# Patient Record
Sex: FEMALE | Race: WHITE
[De-identification: names, ages, dates, MRNs, and addresses within clinical notes are randomized per-mention and may not be internally consistent; named-entity substitution may affect disease eponyms.]

---

## 2017-12-15 ENCOUNTER — HOSPITAL ENCOUNTER (EMERGENCY)
Dept: HOSPITAL 10 - FTE | Age: 37
LOS: 1 days | Discharge: LEFT BEFORE DISCHARGE | End: 2017-12-16
Payer: SELF-PAY

## 2017-12-15 VITALS
HEIGHT: 63 IN | WEIGHT: 132.5 LBS | HEIGHT: 63 IN | WEIGHT: 132.5 LBS | BODY MASS INDEX: 23.48 KG/M2 | BODY MASS INDEX: 23.48 KG/M2

## 2017-12-15 DIAGNOSIS — Z53.21: Primary | ICD-10-CM

## 2018-01-26 ENCOUNTER — HOSPITAL ENCOUNTER (INPATIENT)
Dept: HOSPITAL 12 - ER | Age: 38
LOS: 1 days | Discharge: HOME | DRG: 756 | End: 2018-01-27
Attending: INTERNAL MEDICINE | Admitting: INTERNAL MEDICINE
Payer: COMMERCIAL

## 2018-01-26 VITALS — SYSTOLIC BLOOD PRESSURE: 139 MMHG | DIASTOLIC BLOOD PRESSURE: 93 MMHG

## 2018-01-26 VITALS — HEIGHT: 67 IN | BODY MASS INDEX: 19.78 KG/M2 | WEIGHT: 126 LBS

## 2018-01-26 VITALS — SYSTOLIC BLOOD PRESSURE: 146 MMHG | DIASTOLIC BLOOD PRESSURE: 94 MMHG

## 2018-01-26 VITALS — DIASTOLIC BLOOD PRESSURE: 91 MMHG | SYSTOLIC BLOOD PRESSURE: 134 MMHG

## 2018-01-26 DIAGNOSIS — I45.6: ICD-10-CM

## 2018-01-26 DIAGNOSIS — F41.9: Primary | ICD-10-CM

## 2018-01-26 DIAGNOSIS — R94.31: ICD-10-CM

## 2018-01-26 LAB
ALP SERPL-CCNC: 70 U/L (ref 50–136)
ALT SERPL W/O P-5'-P-CCNC: 16 U/L (ref 14–59)
AST SERPL-CCNC: 16 U/L (ref 15–37)
BASOPHILS # BLD AUTO: 0 K/UL (ref 0–8)
BASOPHILS NFR BLD AUTO: 0.3 % (ref 0–2)
BILIRUB DIRECT SERPL-MCNC: 0.1 MG/DL (ref 0–0.2)
BILIRUB SERPL-MCNC: 0.5 MG/DL (ref 0.2–1)
BUN SERPL-MCNC: 9 MG/DL (ref 7–18)
CHLORIDE SERPL-SCNC: 102 MMOL/L (ref 98–107)
CO2 SERPL-SCNC: 23 MMOL/L (ref 21–32)
CREAT SERPL-MCNC: 0.8 MG/DL (ref 0.6–1.3)
EOSINOPHIL # BLD AUTO: 0 K/UL (ref 0–0.7)
EOSINOPHIL NFR BLD AUTO: 0.1 % (ref 0–7)
GLUCOSE SERPL-MCNC: 112 MG/DL (ref 74–106)
HCG UR QL: NEGATIVE
HCT VFR BLD AUTO: 40.9 % (ref 31.2–41.9)
HGB BLD-MCNC: 14.6 G/DL (ref 10.9–14.3)
LYMPHOCYTES # BLD AUTO: 1 K/UL (ref 20–40)
LYMPHOCYTES NFR BLD AUTO: 17.9 % (ref 20.5–51.5)
MCH RBC QN AUTO: 33 UUG (ref 24.7–32.8)
MCHC RBC AUTO-ENTMCNC: 36 G/DL (ref 32.3–35.6)
MCV RBC AUTO: 92.4 FL (ref 75.5–95.3)
MONOCYTES # BLD AUTO: 0.4 K/UL (ref 2–10)
MONOCYTES NFR BLD AUTO: 7.2 % (ref 0–11)
NEUTROPHILS # BLD AUTO: 4.2 K/UL (ref 1.8–8.9)
NEUTROPHILS NFR BLD AUTO: 74.5 % (ref 38.5–71.5)
PLATELET # BLD AUTO: 264 K/UL (ref 179–408)
POTASSIUM SERPL-SCNC: 4 MMOL/L (ref 3.5–5.1)
RBC # BLD AUTO: 4.42 MIL/UL (ref 3.63–4.92)
WBC # BLD AUTO: 5.6 K/UL (ref 3.8–11.8)
WS STN SPEC: 8.3 G/DL (ref 6.4–8.2)

## 2018-01-26 PROCEDURE — A4663 DIALYSIS BLOOD PRESSURE CUFF: HCPCS

## 2018-01-26 NOTE — NUR
RECEIVED PT IN BED, IN NO ACUTE SIGN OF DISTRESS. REPORT GIVEN BY PREVIOUS NURSE, POSSIBLE 
DISCHARGE / TRANSFER.

## 2018-01-26 NOTE — NUR
ADMITTED FROM HOME A 36 YO FEMALE WITH CC OF PALPITATION X2 DAYS, DX ARRYTHMIA. ROUTINE 
ADMISSION ASSESSMENT INITIATED. DR SEAY NOTIFIED OF ADMISSION AWAITING CALL BACK

-------------------------------------------------------------------------------

Addendum: 01/26/18 at 1857 by EDGAR HOLUGIN RN

-------------------------------------------------------------------------------

ARRIVED ON SECOND FLOOR AT 1800

## 2018-01-27 NOTE — NUR
PT WAS DISCHARGED HOME AS ORDERED, STABLE IN CONDITION. PT WAS SEEN BY DR. DARLING EARLIER. 
DISCHARGE INSTRUCTIONS WERE GIVEN,  VERBALIZED UNDERSTANDING. COPIES GIVEN. IV SITE WAS 
DISCONTINUED. PT WAS ACCOMPANIED BY BOYFRIEND.